# Patient Record
Sex: FEMALE | Race: BLACK OR AFRICAN AMERICAN | NOT HISPANIC OR LATINO | URBAN - METROPOLITAN AREA
[De-identification: names, ages, dates, MRNs, and addresses within clinical notes are randomized per-mention and may not be internally consistent; named-entity substitution may affect disease eponyms.]

---

## 2024-09-05 ENCOUNTER — LAB OUTSIDE AN ENCOUNTER (OUTPATIENT)
Dept: URBAN - METROPOLITAN AREA CLINIC 19 | Facility: CLINIC | Age: 79
End: 2024-09-05

## 2024-09-05 ENCOUNTER — OFFICE VISIT (OUTPATIENT)
Dept: URBAN - METROPOLITAN AREA CLINIC 19 | Facility: CLINIC | Age: 79
End: 2024-09-05
Payer: MEDICARE

## 2024-09-05 ENCOUNTER — DASHBOARD ENCOUNTERS (OUTPATIENT)
Age: 79
End: 2024-09-05

## 2024-09-05 VITALS
BODY MASS INDEX: 23.79 KG/M2 | OXYGEN SATURATION: 99 % | HEIGHT: 65 IN | HEART RATE: 68 BPM | DIASTOLIC BLOOD PRESSURE: 68 MMHG | WEIGHT: 142.8 LBS | TEMPERATURE: 98.4 F | SYSTOLIC BLOOD PRESSURE: 122 MMHG

## 2024-09-05 DIAGNOSIS — K59.09 OTHER CONSTIPATION: ICD-10-CM

## 2024-09-05 DIAGNOSIS — K76.9 CHRONIC LIVER DISEASE: ICD-10-CM

## 2024-09-05 DIAGNOSIS — R07.89 ATYPICAL CHEST PAIN: ICD-10-CM

## 2024-09-05 PROBLEM — 328383001: Status: ACTIVE | Noted: 2024-09-05

## 2024-09-05 PROCEDURE — 99204 OFFICE O/P NEW MOD 45 MIN: CPT | Performed by: STUDENT IN AN ORGANIZED HEALTH CARE EDUCATION/TRAINING PROGRAM

## 2024-09-05 RX ORDER — PANTOPRAZOLE SODIUM 40 MG/1
1 TABLET TABLET, DELAYED RELEASE ORAL ONCE A DAY
Qty: 30 | OUTPATIENT
Start: 2024-09-05

## 2024-09-05 NOTE — HPI-TODAY'S VISIT:
9/5/2024:  Park: 80 yo F with non-specific symptoms  such as palpitations.  Thought to have discomfort.  Went to PCP first and was told she had abnormal EKG.  Then went to the ER. Workup revealed hepatomegaly with mildly elevated LFTs.  Has a h/o alcohol abuse. Was also suspected of gastritis.  Given pantoprazole which has helped. Constipation.

## 2024-10-18 ENCOUNTER — OFFICE VISIT (OUTPATIENT)
Dept: URBAN - METROPOLITAN AREA SURGERY CENTER 31 | Facility: SURGERY CENTER | Age: 79
End: 2024-10-18
Payer: MEDICARE

## 2024-10-18 ENCOUNTER — CLAIMS CREATED FROM THE CLAIM WINDOW (OUTPATIENT)
Dept: URBAN - METROPOLITAN AREA CLINIC 4 | Facility: CLINIC | Age: 79
End: 2024-10-18
Payer: MEDICARE

## 2024-10-18 ENCOUNTER — TELEPHONE ENCOUNTER (OUTPATIENT)
Dept: URBAN - METROPOLITAN AREA CLINIC 19 | Facility: CLINIC | Age: 79
End: 2024-10-18

## 2024-10-18 DIAGNOSIS — K25.7 CHRONIC GASTRIC ULCER WITHOUT HEMORRHAGE OR PERFORATION: ICD-10-CM

## 2024-10-18 DIAGNOSIS — K31.89 OTHER DISEASES OF STOMACH AND DUODENUM: ICD-10-CM

## 2024-10-18 DIAGNOSIS — K31.89 FOCAL FOVEOLAR HYPERPLASIA: ICD-10-CM

## 2024-10-18 DIAGNOSIS — K25.9 ANTRAL ULCER: ICD-10-CM

## 2024-10-18 DIAGNOSIS — K31.89 ACHYLIA: ICD-10-CM

## 2024-10-18 DIAGNOSIS — R07.89 ACUTE CHEST WALL PAIN: ICD-10-CM

## 2024-10-18 PROCEDURE — 88342 IMHCHEM/IMCYTCHM 1ST ANTB: CPT | Performed by: PATHOLOGY

## 2024-10-18 PROCEDURE — 00731 ANES UPR GI NDSC PX NOS: CPT | Performed by: NURSE ANESTHETIST, CERTIFIED REGISTERED

## 2024-10-18 PROCEDURE — 88305 TISSUE EXAM BY PATHOLOGIST: CPT | Performed by: PATHOLOGY

## 2024-10-18 PROCEDURE — 88341 IMHCHEM/IMCYTCHM EA ADD ANTB: CPT | Performed by: PATHOLOGY

## 2024-10-18 PROCEDURE — 43239 EGD BIOPSY SINGLE/MULTIPLE: CPT | Performed by: STUDENT IN AN ORGANIZED HEALTH CARE EDUCATION/TRAINING PROGRAM

## 2024-10-18 RX ORDER — PANTOPRAZOLE SODIUM 40 MG/1
1 TABLET TABLET, DELAYED RELEASE ORAL ONCE A DAY
Qty: 30 | Status: ACTIVE | COMMUNITY
Start: 2024-09-05

## 2024-11-21 ENCOUNTER — OFFICE VISIT (OUTPATIENT)
Dept: URBAN - METROPOLITAN AREA CLINIC 19 | Facility: CLINIC | Age: 79
End: 2024-11-21
Payer: MEDICARE

## 2024-11-21 VITALS
HEIGHT: 65 IN | OXYGEN SATURATION: 96 % | SYSTOLIC BLOOD PRESSURE: 142 MMHG | WEIGHT: 140.4 LBS | BODY MASS INDEX: 23.39 KG/M2 | TEMPERATURE: 97.9 F | HEART RATE: 66 BPM | DIASTOLIC BLOOD PRESSURE: 82 MMHG

## 2024-11-21 DIAGNOSIS — K31.89 EROSIVE GASTROPATHY: ICD-10-CM

## 2024-11-21 DIAGNOSIS — K59.09 OTHER CONSTIPATION: ICD-10-CM

## 2024-11-21 DIAGNOSIS — R07.89 ATYPICAL CHEST PAIN: ICD-10-CM

## 2024-11-21 DIAGNOSIS — K76.9 CHRONIC LIVER DISEASE: ICD-10-CM

## 2024-11-21 PROCEDURE — 99213 OFFICE O/P EST LOW 20 MIN: CPT | Performed by: STUDENT IN AN ORGANIZED HEALTH CARE EDUCATION/TRAINING PROGRAM

## 2024-11-21 RX ORDER — PANTOPRAZOLE SODIUM 40 MG/1
1 TABLET TABLET, DELAYED RELEASE ORAL ONCE A DAY
Qty: 30 | Status: ACTIVE | COMMUNITY
Start: 2024-09-05

## 2024-11-21 RX ORDER — PANTOPRAZOLE SODIUM 40 MG/1
1 TABLET TABLET, DELAYED RELEASE ORAL ONCE A DAY
Qty: 30 | OUTPATIENT

## 2024-11-21 NOTE — HPI-TODAY'S VISIT:
11/21/2024:  Park: 80 yo F with PUD and gastritis as welll as hepatomegaly and abnormal LFTs in the setting of a h/o alcohol use presents for follow up.  s/p EGD with gastritis and gastric erosions noted.  Pt started on pantoprazole and sucralfate.  Recommended miralax and prune juice for constipation. No symptoms at this time.  She is just worried about her health.  We discussed findings.  =====================  10/18/2024:  EGD - Gastric erythema and erosions.  Otherwise normal exam.  Biopsies showed foveolar hyperplasia, negative for H. pylori.  sucralfate added along with pantoprazole.  9/5/2024:  Park: 80 yo F with non-specific symptoms  such as palpitations.  Thought to have discomfort.  Went to PCP first and was told she had abnormal EKG.  Then went to the ER. Workup revealed hepatomegaly with mildly elevated LFTs.  Has a h/o alcohol abuse. Was also suspected of gastritis.  Given pantoprazole which has helped. Constipation. Plan:  EGD, protonix, prune juice or miralax for contipation.  Alcohol abstinence.

## 2025-05-08 ENCOUNTER — OFFICE VISIT (OUTPATIENT)
Dept: URBAN - METROPOLITAN AREA CLINIC 19 | Facility: CLINIC | Age: 80
End: 2025-05-08
Payer: MEDICARE

## 2025-05-08 DIAGNOSIS — K31.89 EROSIVE GASTROPATHY: ICD-10-CM

## 2025-05-08 DIAGNOSIS — R07.89 ATYPICAL CHEST PAIN: ICD-10-CM

## 2025-05-08 DIAGNOSIS — K59.09 OTHER CONSTIPATION: ICD-10-CM

## 2025-05-08 DIAGNOSIS — K76.9 CHRONIC LIVER DISEASE: ICD-10-CM

## 2025-05-08 PROCEDURE — 99214 OFFICE O/P EST MOD 30 MIN: CPT | Performed by: STUDENT IN AN ORGANIZED HEALTH CARE EDUCATION/TRAINING PROGRAM

## 2025-05-08 RX ORDER — PANTOPRAZOLE SODIUM 40 MG/1
1 TABLET TABLET, DELAYED RELEASE ORAL ONCE A DAY
Qty: 30 | OUTPATIENT
Start: 2025-05-07

## 2025-05-08 RX ORDER — PANTOPRAZOLE SODIUM 40 MG/1
1 TABLET TABLET, DELAYED RELEASE ORAL ONCE A DAY
Qty: 30 | Status: ACTIVE | COMMUNITY

## 2025-05-08 RX ORDER — BISACODYL 10 MG
1 SUPPOSITORY AS NEEDED SUPPOSITORY, RECTAL RECTAL ONCE A DAY
Refills: 3 | OUTPATIENT
Start: 2025-05-08 | End: 2025-06-05

## 2025-05-08 NOTE — HPI-TODAY'S VISIT:
5/8/2025:Park: 79-year-old female with atypical chest pain with gastritis treated with a short course of PPI and sucralfate and MiraLAX for constipation returns for follow-up.  Status post EGD in October 2024 with mild gastritis found.  Recommended a 2-month repeat endoscopy to check for healing.  PCP recommended to monitor CBC, CMP, PT/INR every 6 months and annual ultrasound of liver for monitoring of liver disease.  Here for follow-up. Has been having constipation for 3 weeks. no changes in the diet.  Has had abdominal pain.

## 2025-05-08 NOTE — HPI-OTHER HISTORIES
11/21/2024:  Park: 80 yo F with PUD and gastritis as welll as hepatomegaly and abnormal LFTs in the setting of a h/o alcohol use presents for follow up.  s/p EGD with gastritis and gastric erosions noted.  Pt started on pantoprazole and sucralfate.  Recommended miralax and prune juice for constipation. No symptoms at this time.  She is just worried about her health.  We discussed findings. Plan: Discontinue pantoprazole and sucralfate for now but restart if symptoms return.  MiraLAX or prune juice for constipation.  PCP to obtain CBC, CMP, PT/INR 2 times a year and ultrasound once a year to monitor.  Okay to be monitored by PCP.  =====================  10/18/2024:  EGD - Gastric erythema and erosions.  Otherwise normal exam.  Biopsies showed foveolar hyperplasia, negative for H. pylori.  sucralfate added along with pantoprazole.  Procedure in 2 months to check for healing.  9/5/2024:  Park: 80 yo F with non-specific symptoms  such as palpitations.  Thought to have discomfort.  Went to PCP first and was told she had abnormal EKG.  Then went to the ER. Workup revealed hepatomegaly with mildly elevated LFTs.  Has a h/o alcohol abuse. Was also suspected of gastritis.  Given pantoprazole which has helped. Constipation. Plan:  EGD, protonix, prune juice or miralax for contipation.  Alcohol abstinence.

## 2025-05-10 ENCOUNTER — LAB OUTSIDE AN ENCOUNTER (OUTPATIENT)
Dept: URBAN - METROPOLITAN AREA CLINIC 19 | Facility: CLINIC | Age: 80
End: 2025-05-10

## 2025-05-12 ENCOUNTER — TELEPHONE ENCOUNTER (OUTPATIENT)
Dept: URBAN - METROPOLITAN AREA CLINIC 19 | Facility: CLINIC | Age: 80
End: 2025-05-12

## 2025-05-12 LAB
CREATININE POC: 0.8
PERFORMING LAB: (no result)

## 2025-05-16 ENCOUNTER — TELEPHONE ENCOUNTER (OUTPATIENT)
Dept: URBAN - METROPOLITAN AREA CLINIC 19 | Facility: CLINIC | Age: 80
End: 2025-05-16

## 2025-05-20 ENCOUNTER — OFFICE VISIT (OUTPATIENT)
Dept: URBAN - METROPOLITAN AREA MEDICAL CENTER 25 | Facility: MEDICAL CENTER | Age: 80
End: 2025-05-20

## 2025-05-20 RX ORDER — BISACODYL 10 MG
1 SUPPOSITORY AS NEEDED SUPPOSITORY, RECTAL RECTAL ONCE A DAY
Refills: 3 | Status: ACTIVE | COMMUNITY
Start: 2025-05-08 | End: 2025-06-05

## 2025-05-20 RX ORDER — PANTOPRAZOLE SODIUM 40 MG/1
1 TABLET TABLET, DELAYED RELEASE ORAL ONCE A DAY
Qty: 30 | Status: ACTIVE | COMMUNITY
Start: 2025-05-07